# Patient Record
Sex: FEMALE | Race: WHITE | Employment: UNEMPLOYED | URBAN - METROPOLITAN AREA
[De-identification: names, ages, dates, MRNs, and addresses within clinical notes are randomized per-mention and may not be internally consistent; named-entity substitution may affect disease eponyms.]

---

## 2018-10-05 ENCOUNTER — HOSPITAL ENCOUNTER (OUTPATIENT)
Dept: GENERAL RADIOLOGY | Age: 83
Discharge: HOME | End: 2018-10-05
Payer: MEDICARE

## 2018-10-05 DIAGNOSIS — M54.2 CERVICAL PAIN: ICD-10-CM

## 2018-10-05 PROCEDURE — 72050 X-RAY EXAM NECK SPINE 4/5VWS: CPT

## 2019-02-11 ENCOUNTER — HOSPITAL ENCOUNTER (OUTPATIENT)
Dept: MRI IMAGING | Age: 84
Discharge: HOME | End: 2019-02-11
Attending: NURSE PRACTITIONER
Payer: MEDICARE

## 2019-02-11 DIAGNOSIS — M54.16 LUMBAR RADICULOPATHY: ICD-10-CM

## 2019-02-11 PROCEDURE — 72148 MRI LUMBAR SPINE W/O DYE: CPT

## 2020-06-17 ENCOUNTER — HOSPITAL ENCOUNTER (OUTPATIENT)
Dept: GENERAL RADIOLOGY | Age: 85
Discharge: HOME | End: 2020-06-17
Attending: NURSE PRACTITIONER
Payer: MEDICARE

## 2020-06-17 DIAGNOSIS — M25.552 LEFT HIP PAIN: ICD-10-CM

## 2020-06-17 DIAGNOSIS — R52 PAIN: ICD-10-CM

## 2020-06-17 PROCEDURE — 73552 X-RAY EXAM OF FEMUR 2/>: CPT

## 2020-06-17 PROCEDURE — 73502 X-RAY EXAM HIP UNI 2-3 VIEWS: CPT

## 2020-06-29 ENCOUNTER — APPOINTMENT (OUTPATIENT)
Dept: GENERAL RADIOLOGY | Age: 85
End: 2020-06-29
Attending: PHYSICIAN ASSISTANT
Payer: MEDICARE

## 2020-06-29 ENCOUNTER — HOSPITAL ENCOUNTER (EMERGENCY)
Age: 85
Discharge: HOME | End: 2020-06-29
Attending: EMERGENCY MEDICINE | Admitting: EMERGENCY MEDICINE
Payer: MEDICARE

## 2020-06-29 VITALS
TEMPERATURE: 97.6 F | SYSTOLIC BLOOD PRESSURE: 129 MMHG | HEART RATE: 59 BPM | OXYGEN SATURATION: 94 % | DIASTOLIC BLOOD PRESSURE: 84 MMHG | RESPIRATION RATE: 16 BRPM

## 2020-06-29 DIAGNOSIS — G89.29 CHRONIC LEFT-SIDED LOW BACK PAIN WITHOUT SCIATICA: Primary | ICD-10-CM

## 2020-06-29 DIAGNOSIS — M54.50 CHRONIC LEFT-SIDED LOW BACK PAIN WITHOUT SCIATICA: Primary | ICD-10-CM

## 2020-06-29 PROCEDURE — 72100 X-RAY EXAM L-S SPINE 2/3 VWS: CPT

## 2020-06-29 PROCEDURE — 73502 X-RAY EXAM HIP UNI 2-3 VIEWS: CPT

## 2020-06-29 PROCEDURE — 99284 EMERGENCY DEPT VISIT MOD MDM: CPT

## 2020-06-29 PROCEDURE — 74011250637 HC RX REV CODE- 250/637: Performed by: PHYSICIAN ASSISTANT

## 2020-06-29 RX ORDER — HYDROCODONE BITARTRATE AND ACETAMINOPHEN 5; 325 MG/1; MG/1
1 TABLET ORAL
Qty: 10 TAB | Refills: 0 | Status: SHIPPED | OUTPATIENT
Start: 2020-06-29 | End: 2020-07-02

## 2020-06-29 RX ORDER — OXYCODONE AND ACETAMINOPHEN 5; 325 MG/1; MG/1
1 TABLET ORAL
Status: COMPLETED | OUTPATIENT
Start: 2020-06-29 | End: 2020-06-29

## 2020-06-29 RX ADMIN — OXYCODONE HYDROCHLORIDE AND ACETAMINOPHEN 1 TABLET: 5; 325 TABLET ORAL at 16:45

## 2020-06-29 NOTE — ED PROVIDER NOTES
80-year-old female history of diabetes, hypertension presenting to the ER for left hip/back pain. Patient reports that for several years she has had issues with pain in the left lower back that somewhat radiates into the left groin. Denies any fall or trauma. Patient notes the pain has been worsening over the last couple of weeks, saw an unknown orthopedist who then referred her to Dr. Kat Watts, pain management. Patient reports that she has not had imaging since May 2019. Notes that she has had 2 JOJO's in the last couple of weeks without significant improvement. Denies numbness or weakness in the legs, incontinence of bowel/bladder, saddle paresthesia, fever. Past Medical History:   Diagnosis Date    Diabetes ([facility] [address]     Hypertension        History reviewed. No pertinent surgical history. History reviewed. No pertinent family history.     Social History     Socioeconomic History    Marital status:      Spouse name: Not on file    Number of children: Not on file    Years of education: Not on file    Highest education level: Not on file   Occupational History    Not on file   Social Needs    Financial resource strain: Not on file    Food insecurity     Worry: Not on file     Inability: Not on file    Transportation needs     Medical: Not on file     Non-medical: Not on file   Tobacco Use    Smoking status: Not on file   Substance and Sexual Activity    Alcohol use: Not on file    Drug use: Not on file    Sexual activity: Not on file   Lifestyle    Physical activity     Days per week: Not on file     Minutes per session: Not on file    Stress: Not on file   Relationships    Social connections     Talks on phone: Not on file     Gets together: Not on file     Attends Zoroastrianism service: Not on file     Active member of club or organization: Not on file     Attends meetings of clubs or organizations: Not on file     Relationship status: Not on file    Intimate partner violence Fear of current or ex partner: Not on file     Emotionally abused: Not on file     Physically abused: Not on file     Forced sexual activity: Not on file   Other Topics Concern    Not on file   Social History Narrative    Not on file         ALLERGIES: Losartan; Penicillins; and Sulfa (sulfonamide antibiotics)    Review of Systems   Constitutional: Negative for fever. HENT: Negative for facial swelling. Respiratory: Negative for shortness of breath. Cardiovascular: Negative for chest pain. Gastrointestinal: Negative for vomiting. Musculoskeletal: Positive for arthralgias and back pain. Skin: Negative for wound. Neurological: Negative for syncope. All other systems reviewed and are negative. Vitals:    06/29/20 1458 06/29/20 1505   BP: 128/69    Pulse: (!) 57    Resp: 17    Temp: 97.3 °F (36.3 °C)    SpO2: 93% 93%            Physical Exam  Vitals signs and nursing note reviewed. Constitutional:       Appearance: She is well-developed. Comments: Pleasant, talkative, elderly white female   HENT:      Head: Normocephalic. Eyes:      Conjunctiva/sclera: Conjunctivae normal.   Neck:      Musculoskeletal: Neck supple. Cardiovascular:      Rate and Rhythm: Normal rate. Pulmonary:      Effort: Pulmonary effort is normal. No respiratory distress. Musculoskeletal: Normal range of motion. Comments: Tenderness of the left lower back without focal bony midline tenderness  Some reproduction of pain with passive flexion of the left hip as well as a straight leg raise  Distal strength, sensation, pulses intact   Skin:     General: Skin is warm and dry. Neurological:      Mental Status: She is alert and oriented to person, place, and time.           MDM  Number of Diagnoses or Management Options  Chronic left-sided low back pain without sciatica:   Diagnosis management comments: 49-year-old female presenting to the ER for exacerbation of her chronic pain, has been taking tramadol at home, has seen pain management. Has not had imaging in over a year. No signs or symptoms concerning for cord compression, no recent falls. Will check plain films, discussed with patient's pain management physician. See note in chart for discussion with pain management. No acute findings on x-ray. Discussed use of short course of pain medication at home, follow-up with orthopedics and primary care. Amount and/or Complexity of Data Reviewed  Tests in the radiology section of CPT®: reviewed and ordered  Discuss the patient with other providers: yes (Dr. Fanny Dao ED attending)           Procedures            Discussed with pain management, notes that all pain medication has been handled by PCP and there is currently no active contract with patient.     VALERIA Singh  4:58 PM

## 2020-06-29 NOTE — DISCHARGE INSTRUCTIONS

## 2020-06-29 NOTE — ED TRIAGE NOTES
Pt to ED with cc of L hip pain x a couple of years. Pt has been seen for this issue multiple time but pain was 10/10 today. Pt denies numbness/tingling or any new complaints.

## 2020-06-30 ENCOUNTER — TELEPHONE (OUTPATIENT)
Dept: CASE MANAGEMENT | Age: 85
End: 2020-06-30

## 2020-06-30 NOTE — TELEPHONE ENCOUNTER
F/U call placed to patient. Introduced to role of CM and HIPAA identifier verified. Patient states still in pain has taken 2 of [address] lives alone and granddaughter Aravind Pandya to stay w/ her this afternoon/evening. Discussed resources [facility] and f/u w/ PCP (patient uncertain if can make it into office secondary to pain). CM will contact granddaughter this afternoon once at patient's home ('s preference).

## 2020-06-30 NOTE — TELEPHONE ENCOUNTER
Contact made w/ Granddaughter Oswaldo French introduced to role of CM. Additional history obtained home is 2 story w/ 4 steps Patient has been independent w/ ADLs and drives. Last PCP appointment w/ Pratibha Sanchez last week. granddaughter and daughter live 15 minutes from home and son 25 minutes. Family will continue to check in on patient during this time. Provided [facility] as resource for urgent care. Discussed HH/PT as another option. Patient is receptive of referral no preference. Referral sent to [facility] [phone] / fax [phone] currently experiencing issues w/ [employer]. Call received from provider has accepted case and will plan on visit 7/1. CM placed call back to patient spoke w/ Yesi Khan and provided updates. No additional needs verbalized. Expressed appreciation for interventions made on patient's behalf.

## 2021-06-17 ENCOUNTER — HOSPITAL ENCOUNTER (OUTPATIENT)
Dept: GENERAL RADIOLOGY | Age: 86
Discharge: HOME | End: 2021-06-17
Attending: INTERNAL MEDICINE
Payer: MEDICARE

## 2021-06-17 ENCOUNTER — TRANSCRIBE ORDER (OUTPATIENT)
Dept: GENERAL RADIOLOGY | Age: 86
End: 2021-06-17

## 2021-06-17 DIAGNOSIS — M19.90 ARTHRITIS: Primary | ICD-10-CM

## 2021-06-17 DIAGNOSIS — M19.90 ARTHRITIS: ICD-10-CM

## 2021-06-17 PROCEDURE — 73502 X-RAY EXAM HIP UNI 2-3 VIEWS: CPT
